# Patient Record
Sex: FEMALE | Race: WHITE
[De-identification: names, ages, dates, MRNs, and addresses within clinical notes are randomized per-mention and may not be internally consistent; named-entity substitution may affect disease eponyms.]

---

## 2019-12-23 ENCOUNTER — HOSPITAL ENCOUNTER (EMERGENCY)
Dept: HOSPITAL 25 - UCEAST | Age: 19
Discharge: HOME | End: 2019-12-23
Payer: COMMERCIAL

## 2019-12-23 VITALS — SYSTOLIC BLOOD PRESSURE: 136 MMHG | DIASTOLIC BLOOD PRESSURE: 65 MMHG

## 2019-12-23 DIAGNOSIS — J02.9: ICD-10-CM

## 2019-12-23 DIAGNOSIS — R09.81: ICD-10-CM

## 2019-12-23 DIAGNOSIS — B34.9: Primary | ICD-10-CM

## 2019-12-23 DIAGNOSIS — R53.83: ICD-10-CM

## 2019-12-23 LAB
FLUAV RNA SPEC QL NAA+PROBE: NEGATIVE
FLUBV RNA SPEC QL NAA+PROBE: NEGATIVE

## 2019-12-23 PROCEDURE — G0463 HOSPITAL OUTPT CLINIC VISIT: HCPCS

## 2019-12-23 PROCEDURE — 87651 STREP A DNA AMP PROBE: CPT

## 2019-12-23 PROCEDURE — 99211 OFF/OP EST MAY X REQ PHY/QHP: CPT

## 2019-12-23 NOTE — UC
Complaint Female HPI





- HPI Summary


HPI Summary: 





18 yo female presents with flu like symptoms. She tells me that on 12/19/19 she 

developed fever, body aches, fatigue, sinus congestion, sore throat, and dry 

cough. She has been taking ibuprofen with good relief. Tmax 101.5F. Today she 

has been afebrile and feels much better, but mentions she wanted to be checked 

out as she is going out of the country on vacation next week. Denies SOB, 

abdominal pain, n/v, rash, dysuria. 





- History Of Current Complaint


Stated Complaint: FEVER


Time Seen by Provider: 12/23/19 14:03


Hx Obtained From: Patient


Onset/Duration: Sudden Onset


Severity Initially: Moderate


Severity Currently: Mild


Pain Intensity: 3


Pain Scale Used: 0-10 Numeric





- Allergies/Home Medications


Allergies/Adverse Reactions: 


 Allergies











Allergy/AdvReac Type Severity Reaction Status Date / Time


 


No Known Allergies Allergy   Unverified 05/02/13 08:51














PMH/Surg Hx/FS Hx/Imm Hx





- Additional Past Medical History


Additional PMH: 





None





- Surgical History


Surgical History: Yes


Surgery Procedure, Year, and Place: TEAR DUCT,





- Family History


Known Family History: Positive: None





- Social History


Occupation: Student


Lives: With Family


Alcohol Use: Occasionally


Substance Use Type: None


Smoking Status (MU): Never Smoked Tobacco





Review of Systems


All Other Systems Reviewed And Are Negative: No


Constitutional: Positive: Fever, Fatigue, Other - Body aches


Skin: Positive: Negative


Eyes: Positive: Negative


ENT: Positive: Sore Throat, Sinus Congestion


Respiratory: Positive: Cough


Cardiovascular: Positive: Negative


Gastrointestinal: Positive: Negative


Neurological: Positive: Negative


Psychological: Positive: Negative





Physical Exam





- Summary


Physical Exam Summary: 





GENERAL: NAD. WDWN. No pain distress.


SKIN: No rashes, sores, lesions, or open wounds.


HEENT:


            Head: AT/NC


            Eyes: EOM intact. Conjunctiva clear without inflammation or 

discharge.


            Ears: Hearing grossly normal. TMs intact, no bulging, erythema, or 

edema. 


            Nose: Nasal mucosa pink and moist. NTTP maxillary and frontal 

sinus. 


            Throat: Posterior oropharynx without exudates, erythema, or 

tonsillar enlargement.  Uvula midline.


NECK: Supple. Nontender. No lymphadenopathy. 


CHEST:  CTAB. No accessory muscle use. Breathing comfortably and in no distress.


CV:  RRR. Pulses intact. Cap refill <2seconds


NEURO: Alert. 


PSYCH: Age appropriate behavior.


Triage Information Reviewed: Yes


Vital Signs: 





Vital Signs:











Temp Pulse Resp BP Pulse Ox


 


 98.5 F   90   18   136/65   97 


 


 12/23/19 14:07  12/23/19 14:07  12/23/19 14:07  12/23/19 14:07  12/23/19 14:07








 Laboratory Tests











  12/23/19 12/23/19





  14:25 14:29


 


Influenza A (Rapid)   Negative


 


Influenza B (Rapid)   Negative


 


Group A Strep Rapid  Negative 











Vital Signs Reviewed: Yes





 Complaint Female Dx





- Course


Course Of Treatment: 





POC strep and flu negative.


Exam WNL.


Pt feeling better. Suspect viral illness.


Advised to continue supportive care and be rechecked if symptoms worsen





- Differential Dx/Diagnosis


Provider Diagnosis: 


 Viral syndrome








Discharge ED





- Sign-Out/Discharge


Documenting (check all that apply): Patient Departure


All imaging exams completed and their final reports reviewed: No Studies





- Discharge Plan


Condition: Stable


Disposition: HOME


Patient Education Materials:  Viral Syndrome (ED)


Referrals: 


Rambo Black MD [Primary Care Provider] - 


Additional Instructions: 


If you develop a fever, shortness of breath, chest pain, new or worsening 

symptoms - please call your PCP or go to the ED immediately.


 


 Your symptoms are likely from a viral infection. Viral infections do not 

respond to antibiotics and are limited to the treatment of symptoms. Viral 

infections typically run their course in 7-10 days.


 


Drink plenty of fluids, especially if you are running any fever.


 


Use salt water gargles several times a day.


 


Take over the counter acetaminophen (Tylenol) or ibuprofen (Advil, Motrin) 

according to directions as needed for pain or fever.


 


You may also use Chloraseptic spray or Cepacol lonzenges according to 

directions which contain a numbing medication and can provide some temporary 

relief from a sore throat.


 


Return here or follow up with your primary care provider in 7 days if symptoms 

persist.








- Billing Disposition and Condition


Condition: STABLE


Disposition: Home